# Patient Record
(demographics unavailable — no encounter records)

---

## 2025-03-25 NOTE — ASSESSMENT
[FreeTextEntry1] : 1. papilloma of nasopharynx (long laser fiber) -plan for laser ablation of papilloma of nasopharynx b/l -risks/benefits/alternatives discussed -he wished to proceed - notified

## 2025-03-25 NOTE — PROCEDURE
[Topical Lidocaine] : topical lidocaine [Oxymetazoline HCl] : oxymetazoline HCl [Flexible Endoscope] : examined with the flexible endoscope [Video Captured] : video captured and filed [de-identified] : done to eval for progression of papilloma of nasopharyngeal findings: papilloma noted to nasopharynx L>R, bilateral inferior turbinate resection, post op changes noted - excellent patency

## 2025-03-25 NOTE — PHYSICAL EXAM
[Midline] : trachea located in midline position [Laryngoscopy Performed] : laryngoscopy was performed, see procedure section for findings [Normal] : assessment of respiratory effort is normal

## 2025-03-25 NOTE — HISTORY OF PRESENT ILLNESS
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   Wife translating visit in [place].  He is here to recheck papilloma. Wife reports snoring. Patient reports rhinorrhea. Patient denies dysphagia, sore throat. [de-identified] : uvulectomy

## 2025-06-05 NOTE — HISTORY OF PRESENT ILLNESS
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 Accompanied by wife who is translating [place].

## 2025-06-05 NOTE — PROCEDURE
[FreeTextEntry1] : ablation of nasopharynx lesion [FreeTextEntry2] : lesion vs papilloa of nasopharynx [FreeTextEntry3] :  After informed verbal consent is obtained, the patient was prepped and draped under usual sterile fashion. Pledgets soaked in a 1:1 ratio of topical vasoconstrictor (0.05% oxymetazoline) and anesthetic (4% lidocaine) was applied to bilateral nasal cavity. After a 5 minute pause, the pledgets were removed with bayonnet forceps. Pledgets soaked in 2.5% lidocaine/2.5% tetracaine gel were inserted into the posterior aspect of the middle meatus bilateral and removed after a 5 minute pause.  approximately 1-2 cc 2% lidocaine with epinephrine (1:100,000) was injected to the entry of eustachian tube right proximal to location of papilloma using a #27 spinal needle. Under 0 degree endoscope guidance with video,, the endoscope and diode laser was inserted into the R nasal cavity and the papilloma was identified. The papilloma was then ablated using diode laser in contact mode 3.5 gyu. Another papilloma was idenitified in the nasopharynx and ablated using diode laser in contact mode 3.5 guy. The endoscope and diode laser was then removed from the nasal cavity and then entered into the oral cavity. The patient was advised to say the letter "A" and the papilloma was noted to superior aspect of oropharynx. The papilloma was then removed via ameya forceps and the base was ablated with diode laser at 5 guy. Surgical smoke was evacuated with suction. Patient was stable and well during and after procedure. The patient was given ice cold water to gargle and spit out. Post op care discussed, worksheet provided.

## 2025-06-05 NOTE — PHYSICAL EXAM
[Midline] : trachea located in midline position [Laryngoscopy Performed] : laryngoscopy was performed, see procedure section for findings [Normal] : assessment of respiratory effort is normal [de-identified] : papilloma noted to nasopharynx

## 2025-06-05 NOTE — ASSESSMENT
[FreeTextEntry1] : 1. lesion of nasopharynx -ablation of nasopharynx was performed without complication -post-op care discussed, worksheet provided -RTC in x3-4 weeks for post op check 98

## 2025-06-05 NOTE — ASSESSMENT
[FreeTextEntry1] : 1. lesion of nasopharynx -ablation of nasopharynx was performed without complication -post-op care discussed, worksheet provided -RTC in x3-4 weeks for post op check

## 2025-06-05 NOTE — PHYSICAL EXAM
[Midline] : trachea located in midline position [Laryngoscopy Performed] : laryngoscopy was performed, see procedure section for findings [Normal] : assessment of respiratory effort is normal [de-identified] : papilloma noted to nasopharynx

## 2025-06-05 NOTE — HISTORY OF PRESENT ILLNESS
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 Accompanied by wife who is translating [place]ian.

## 2025-06-05 NOTE — PROCEDURE
[FreeTextEntry1] : ablation of nasopharynx lesion [FreeTextEntry2] : lesion vs papilloa of nasopharynx [FreeTextEntry3] :  After informed verbal consent is obtained, the patient was prepped and draped under usual sterile fashion. Pledgets soaked in a 1:1 ratio of topical vasoconstrictor (0.05% oxymetazoline) and anesthetic (4% lidocaine) was applied to bilateral nasal cavity. After a 5 minute pause, the pledgets were removed with bayonnet forceps. Pledgets soaked in 2.5% lidocaine/2.5% tetracaine gel were inserted into the posterior aspect of the middle meatus bilateral and removed after a 5 minute pause.  approximately 1-2 cc 2% lidocaine with epinephrine (1:100,000) was injected to the entry of eustachian tube right proximal to location of papilloma using a #27 spinal needle. Under 0 degree endoscope guidance with video,, the endoscope and diode laser was inserted into the R nasal cavity and the papilloma was identified. The papilloma was then ablated using diode laser in contact mode 3.5 guy. Another papilloma was idenitified in the nasopharynx and ablated using diode laser in contact mode 3.5 guy. The endoscope and diode laser was then removed from the nasal cavity and then entered into the oral cavity. The patient was advised to say the letter "A" and the papilloma was noted to superior aspect of oropharynx. The papilloma was then removed via ameya forceps and the base was ablated with diode laser at 5 guy. Surgical smoke was evacuated with suction. Patient was stable and well during and after procedure. The patient was given ice cold water to gargle and spit out. Post op care discussed, worksheet provided.

## 2025-06-05 NOTE — HISTORY OF PRESENT ILLNESS
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 Accompanied by wife who is translating [place].

## 2025-06-05 NOTE — END OF VISIT
[FreeTextEntry3] : I, Dr. Solis, personally performed the evaluation and management (E/M) services for this established patient who presents today with (a) new problem(s)/exacerbation of (an) existing condition(s).  That E/M includes conducting the examination, assessing all new/exacerbated conditions, and establishing a new plan of care.  Today, my physician assistant, Corey Garvin, was here to observe my evaluation and management services for this new problem/exacerbated condition to be followed going forward.

## 2025-06-05 NOTE — PHYSICAL EXAM
[Midline] : trachea located in midline position [Laryngoscopy Performed] : laryngoscopy was performed, see procedure section for findings [Normal] : assessment of respiratory effort is normal [de-identified] : papilloma noted to nasopharynx